# Patient Record
Sex: FEMALE | Race: WHITE | NOT HISPANIC OR LATINO | Employment: FULL TIME | URBAN - NONMETROPOLITAN AREA
[De-identification: names, ages, dates, MRNs, and addresses within clinical notes are randomized per-mention and may not be internally consistent; named-entity substitution may affect disease eponyms.]

---
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
## 2022-02-01 ENCOUNTER — HOSPITAL ENCOUNTER (OUTPATIENT)
Dept: LAB | Facility: MEDICAL CENTER | Age: 22
End: 2022-02-01
Attending: OBSTETRICS & GYNECOLOGY
Payer: COMMERCIAL

## 2022-02-01 LAB — B-HCG SERPL-ACNC: <1 MIU/ML (ref 0–5)

## 2022-02-01 PROCEDURE — 36415 COLL VENOUS BLD VENIPUNCTURE: CPT

## 2022-02-01 PROCEDURE — 84702 CHORIONIC GONADOTROPIN TEST: CPT

## 2022-03-02 ENCOUNTER — HOSPITAL ENCOUNTER (OUTPATIENT)
Dept: LAB | Facility: MEDICAL CENTER | Age: 22
End: 2022-03-02
Attending: OBSTETRICS & GYNECOLOGY
Payer: COMMERCIAL

## 2022-03-02 LAB — B-HCG SERPL-ACNC: <1 MIU/ML (ref 0–5)

## 2022-03-02 PROCEDURE — 84702 CHORIONIC GONADOTROPIN TEST: CPT

## 2022-03-02 PROCEDURE — 36415 COLL VENOUS BLD VENIPUNCTURE: CPT

## 2022-03-30 ENCOUNTER — HOSPITAL ENCOUNTER (OUTPATIENT)
Dept: LAB | Facility: MEDICAL CENTER | Age: 22
End: 2022-03-30
Attending: OBSTETRICS & GYNECOLOGY
Payer: COMMERCIAL

## 2022-03-30 LAB — B-HCG SERPL-ACNC: <1 MIU/ML (ref 0–5)

## 2022-03-30 PROCEDURE — 36415 COLL VENOUS BLD VENIPUNCTURE: CPT

## 2022-03-30 PROCEDURE — 84702 CHORIONIC GONADOTROPIN TEST: CPT

## 2023-12-03 ENCOUNTER — OFFICE VISIT (OUTPATIENT)
Dept: URGENT CARE | Facility: CLINIC | Age: 23
End: 2023-12-03
Payer: COMMERCIAL

## 2023-12-03 VITALS
TEMPERATURE: 97.4 F | OXYGEN SATURATION: 98 % | RESPIRATION RATE: 14 BRPM | DIASTOLIC BLOOD PRESSURE: 52 MMHG | SYSTOLIC BLOOD PRESSURE: 112 MMHG | HEIGHT: 65 IN | WEIGHT: 121 LBS | BODY MASS INDEX: 20.16 KG/M2 | HEART RATE: 80 BPM

## 2023-12-03 DIAGNOSIS — B34.9 NONSPECIFIC SYNDROME SUGGESTIVE OF VIRAL ILLNESS: ICD-10-CM

## 2023-12-03 DIAGNOSIS — J02.9 SORE THROAT: ICD-10-CM

## 2023-12-03 LAB — S PYO DNA SPEC NAA+PROBE: NOT DETECTED

## 2023-12-03 PROCEDURE — 99213 OFFICE O/P EST LOW 20 MIN: CPT | Performed by: PHYSICIAN ASSISTANT

## 2023-12-03 PROCEDURE — 3074F SYST BP LT 130 MM HG: CPT | Performed by: PHYSICIAN ASSISTANT

## 2023-12-03 PROCEDURE — 3078F DIAST BP <80 MM HG: CPT | Performed by: PHYSICIAN ASSISTANT

## 2023-12-03 PROCEDURE — 87651 STREP A DNA AMP PROBE: CPT | Performed by: PHYSICIAN ASSISTANT

## 2023-12-03 ASSESSMENT — ENCOUNTER SYMPTOMS
TROUBLE SWALLOWING: 0
HOARSE VOICE: 1
COUGH: 1
SWOLLEN GLANDS: 0

## 2023-12-03 NOTE — PROGRESS NOTES
"Subjective:   Meenu Atkinson is a 23 y.o. female who presents for Pharyngitis (Started all symptoms a week ago ) and Nasal Congestion        Pharyngitis   This is a new problem. The current episode started in the past 7 days. The problem has been unchanged. Maximum temperature: Tmax 99.9F. The pain is moderate. Associated symptoms include congestion, coughing and a hoarse voice. Pertinent negatives include no drooling, ear pain, swollen glands or trouble swallowing. Associated symptoms comments: Pain with swallowing.. She has had exposure to strep. Exposure to: Mom and BF strep +. Treatments tried: theraflu, robitussin, tylenol. The treatment provided moderate relief.     Review of Systems   HENT:  Positive for congestion and hoarse voice. Negative for drooling, ear pain and trouble swallowing.    Respiratory:  Positive for cough.        PMH:  has a past medical history of Kidney infection.  MEDS:   Current Outpatient Medications:     ibuprofen (MOTRIN) 600 MG Tab, Take 1 tablet by mouth every 8 hours as needed (sore throat)., Disp: 20 tablet, Rfl: 0    azithromycin (ZITHROMAX) 250 MG Tab, Use as directed, Disp: 6 tablet, Rfl: 0    ethinyl estradiol-etonogestrel (NUVARING) 0.12-0.015 MG/24HR vaginal ring, Insert new ring vaginally for 3 weeks, remove for 1 week then reinsert new ring., Disp: 3 Each, Rfl: 3  ALLERGIES: No Known Allergies  SURGHX: History reviewed. No pertinent surgical history.  SOCHX:  reports that she has never smoked. She has never used smokeless tobacco. She reports current drug use. Drugs: Marijuana and Inhaled. She reports that she does not drink alcohol.  FH: Family history was reviewed, no pertinent findings to report   Objective:   /52 (BP Location: Left arm, Patient Position: Sitting)   Pulse 80   Temp 36.3 °C (97.4 °F) (Temporal)   Resp 14   Ht 1.651 m (5' 5\")   Wt 54.9 kg (121 lb)   SpO2 98%   BMI 20.14 kg/m²   Physical Exam  Vitals reviewed.   Constitutional:       " General: She is not in acute distress.     Appearance: Normal appearance. She is well-developed. She is not toxic-appearing.   HENT:      Head: Normocephalic and atraumatic.      Right Ear: External ear normal.      Left Ear: External ear normal.      Nose: Rhinorrhea present. No congestion.      Mouth/Throat:      Lips: Pink.      Mouth: Mucous membranes are moist.      Pharynx: Oropharynx is clear. Uvula midline. Posterior oropharyngeal erythema present.      Tonsils: No tonsillar exudate.   Cardiovascular:      Rate and Rhythm: Normal rate and regular rhythm.      Heart sounds: Normal heart sounds, S1 normal and S2 normal.   Pulmonary:      Effort: Pulmonary effort is normal. No respiratory distress.      Breath sounds: Normal breath sounds. No stridor. No decreased breath sounds, wheezing, rhonchi or rales.   Skin:     General: Skin is dry.   Neurological:      Comments: Alert and oriented.    Psychiatric:         Speech: Speech normal.         Behavior: Behavior normal.           Results for orders placed or performed in visit on 12/03/23   POCT GROUP A STREP, PCR   Result Value Ref Range    POC Group A Strep, PCR Not Detected Not Detected, Invalid       Assessment/Plan:   1. Nonspecific syndrome suggestive of viral illness    2. Sore throat  - POCT GROUP A STREP, PCR    Considerations include but not limited to viral URI, sinusitis, allergic rhinitis, influenza, COVID-19, group A strep.  Testing for group A strep is negative.  No evidence of lower respiratory involvement on exam today.      Recommend symptomatic care.  12-hour Mucinex or Mucinex D as needed for congestion.  May also perform nasal saline rinses 2-3 times a day and begin Flonase daily.  Recommend salt water gargles, lozenges, hot tea with honey, and ibuprofen as needed for sore throat.  Tylenol or ibuprofen as needed for fever control, body aches, and headaches.  Ensure adequate rest and hydration.    If symptoms fail to improve within 72 hours,  new symptoms develop, symptoms worsen return to clinic or see PCP for reevaluation.